# Patient Record
Sex: MALE | Race: BLACK OR AFRICAN AMERICAN | NOT HISPANIC OR LATINO | URBAN - METROPOLITAN AREA
[De-identification: names, ages, dates, MRNs, and addresses within clinical notes are randomized per-mention and may not be internally consistent; named-entity substitution may affect disease eponyms.]

---

## 2017-01-23 ENCOUNTER — APPOINTMENT (OUTPATIENT)
Age: 52
Setting detail: DERMATOLOGY
End: 2017-01-25

## 2017-01-23 DIAGNOSIS — A63.0 ANOGENITAL (VENEREAL) WARTS: ICD-10-CM

## 2017-01-23 PROCEDURE — OTHER PRESCRIPTION: OTHER

## 2017-01-23 PROCEDURE — 46916 CRYOSURGERY ANAL LESION(S): CPT

## 2017-01-23 PROCEDURE — OTHER COUNSELING: OTHER

## 2017-01-23 PROCEDURE — OTHER LIQUID NITROGEN GENITALS MULTI: OTHER

## 2017-01-23 RX ORDER — IMIQUIMOD 5 %
CREAM IN PACKET (EA) TOPICAL TIW
Qty: 2 | Refills: 2 | Status: ERX | COMMUNITY
Start: 2017-01-23

## 2017-01-23 ASSESSMENT — LOCATION ZONE DERM
LOCATION ZONE: GENITALIA
LOCATION ZONE: ANUS
LOCATION ZONE: TRUNK

## 2017-01-23 ASSESSMENT — LOCATION DETAILED DESCRIPTION DERM
LOCATION DETAILED: GENITALIA
LOCATION DETAILED: PERIANAL SKIN
LOCATION DETAILED: GLUTEAL CLEFT

## 2017-01-23 ASSESSMENT — LOCATION SIMPLE DESCRIPTION DERM
LOCATION SIMPLE: GLUTEAL CLEFT
LOCATION SIMPLE: PERIANAL SKIN
LOCATION SIMPLE: GENITALIA

## 2017-01-23 NOTE — PROCEDURE: LIQUID NITROGEN GENITALS MULTI
Total Number Of Lesions Treated: 15
Detail Level: Detailed
Post-Care Instructions: I reviewed with the patient in detail post-care instructions. Patient is to wear sunprotection, and avoid picking at any of the treated lesions. Pt may apply Vaseline to crusted or scabbing areas.
Consent: The patient's consent was obtained including but not limited to risks of crusting, scabbing, blistering, scarring, darker or lighter pigmentary change, recurrence, incomplete removal and infection.
Render Post-Care Instructions In Note?: no

## 2023-11-17 DIAGNOSIS — H16.011 CENTRAL CORNEAL ULCER, RIGHT EYE: Primary | ICD-10-CM

## 2023-11-17 PROCEDURE — 99213 OFFICE O/P EST LOW 20 MIN: CPT | Performed by: OPHTHALMOLOGY

## 2025-04-17 ENCOUNTER — OFFICE VISIT (OUTPATIENT)
Dept: URBAN - METROPOLITAN AREA CLINIC 33 | Facility: CLINIC | Age: 60
End: 2025-04-17
Payer: MEDICARE

## 2025-04-17 DIAGNOSIS — H11.423 CONJUNCTIVAL EDEMA, BILATERAL: Primary | ICD-10-CM

## 2025-04-17 PROCEDURE — 99203 OFFICE O/P NEW LOW 30 MIN: CPT

## 2025-04-17 RX ORDER — PREDNISOLONE ACETATE 10 MG/ML
1 % SUSPENSION/ DROPS OPHTHALMIC
Qty: 10 | Refills: 2 | Status: ACTIVE
Start: 2025-04-17

## 2025-04-17 RX ORDER — POLYVINYL ALCOHOL, POVIDONE 14; 6 MG/ML; MG/ML
SOLUTION/ DROPS OPHTHALMIC
Qty: 360 | Refills: 3 | Status: ACTIVE
Start: 2025-04-17

## 2025-04-17 ASSESSMENT — INTRAOCULAR PRESSURE
OS: 25
OD: 21

## 2025-05-12 ENCOUNTER — OFFICE VISIT (OUTPATIENT)
Dept: URBAN - METROPOLITAN AREA CLINIC 33 | Facility: CLINIC | Age: 60
End: 2025-05-12
Payer: MEDICARE

## 2025-05-12 DIAGNOSIS — H11.423 CONJUNCTIVAL EDEMA, BILATERAL: Primary | ICD-10-CM

## 2025-05-12 PROCEDURE — 99213 OFFICE O/P EST LOW 20 MIN: CPT

## 2025-05-12 RX ORDER — CARBOXYMETHYLCELLULOSE SODIUM 10 MG/ML
1 % GEL OPHTHALMIC
Qty: 15 | Refills: 11 | Status: ACTIVE
Start: 2025-05-12

## 2025-05-12 RX ORDER — MINERAL OIL AND WHITE PETROLATUM 30; 940 MG/G; MG/G
OINTMENT OPHTHALMIC
Qty: 5 | Refills: 11 | Status: ACTIVE
Start: 2025-05-12

## 2025-05-12 ASSESSMENT — INTRAOCULAR PRESSURE
OD: 21
OS: 24
OS: 33
OD: 20